# Patient Record
Sex: MALE | Race: OTHER | HISPANIC OR LATINO | URBAN - METROPOLITAN AREA
[De-identification: names, ages, dates, MRNs, and addresses within clinical notes are randomized per-mention and may not be internally consistent; named-entity substitution may affect disease eponyms.]

---
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
## 2023-03-24 ENCOUNTER — EMERGENCY (EMERGENCY)
Facility: HOSPITAL | Age: 23
LOS: 1 days | Discharge: HOME | End: 2023-03-26
Payer: MEDICAID

## 2023-03-24 DIAGNOSIS — R07.9 CHEST PAIN, UNSPECIFIED: ICD-10-CM

## 2023-03-24 DIAGNOSIS — R06.02 SHORTNESS OF BREATH: ICD-10-CM

## 2023-03-24 DIAGNOSIS — R07.89 OTHER CHEST PAIN: ICD-10-CM

## 2023-03-24 DIAGNOSIS — R07.81 PLEURODYNIA: ICD-10-CM

## 2023-03-24 DIAGNOSIS — M54.9 DORSALGIA, UNSPECIFIED: ICD-10-CM

## 2023-03-24 PROCEDURE — 80048 BASIC METABOLIC PNL TOTAL CA: CPT

## 2023-03-24 PROCEDURE — 99053 MED SERV 10PM-8AM 24 HR FAC: CPT

## 2023-03-24 PROCEDURE — 85379 FIBRIN DEGRADATION QUANT: CPT

## 2023-03-24 PROCEDURE — 84484 ASSAY OF TROPONIN QUANT: CPT

## 2023-03-24 PROCEDURE — 96374 THER/PROPH/DIAG INJ IV PUSH: CPT

## 2023-03-24 PROCEDURE — 93005 ELECTROCARDIOGRAM TRACING: CPT

## 2023-03-24 PROCEDURE — 71045 X-RAY EXAM CHEST 1 VIEW: CPT

## 2023-03-24 PROCEDURE — 99285 EMERGENCY DEPT VISIT HI MDM: CPT

## 2023-03-24 PROCEDURE — 36415 COLL VENOUS BLD VENIPUNCTURE: CPT

## 2023-03-24 PROCEDURE — 71045 X-RAY EXAM CHEST 1 VIEW: CPT | Mod: 26

## 2023-03-24 PROCEDURE — 93010 ELECTROCARDIOGRAM REPORT: CPT

## 2023-03-24 PROCEDURE — 99285 EMERGENCY DEPT VISIT HI MDM: CPT | Mod: 25

## 2023-03-24 PROCEDURE — 85025 COMPLETE CBC W/AUTO DIFF WBC: CPT

## 2023-03-25 LAB
ANION GAP SERPL CALC-SCNC: 5 MMOL/L — SIGNIFICANT CHANGE UP (ref 5–17)
BASOPHILS # BLD AUTO: 0.06 K/UL — SIGNIFICANT CHANGE UP (ref 0–0.2)
BASOPHILS NFR BLD AUTO: 0.4 % — SIGNIFICANT CHANGE UP (ref 0–2)
BUN SERPL-MCNC: 15 MG/DL — SIGNIFICANT CHANGE UP (ref 7–23)
CALCIUM SERPL-MCNC: 9.7 MG/DL — SIGNIFICANT CHANGE UP (ref 8.5–10.1)
CHLORIDE SERPL-SCNC: 105 MMOL/L — SIGNIFICANT CHANGE UP (ref 96–108)
CO2 SERPL-SCNC: 28 MMOL/L — SIGNIFICANT CHANGE UP (ref 22–31)
CREAT SERPL-MCNC: 1.22 MG/DL — SIGNIFICANT CHANGE UP (ref 0.5–1.3)
D DIMER BLD IA.RAPID-MCNC: <150 NG/ML DDU — SIGNIFICANT CHANGE UP
EGFR: 87 ML/MIN/1.73M2 — SIGNIFICANT CHANGE UP
EOSINOPHIL # BLD AUTO: 0.21 K/UL — SIGNIFICANT CHANGE UP (ref 0–0.5)
EOSINOPHIL NFR BLD AUTO: 1.3 % — SIGNIFICANT CHANGE UP (ref 0–6)
GLUCOSE SERPL-MCNC: 107 MG/DL — HIGH (ref 70–99)
HCT VFR BLD CALC: 46.4 % — SIGNIFICANT CHANGE UP (ref 39–50)
HGB BLD-MCNC: 15.5 G/DL — SIGNIFICANT CHANGE UP (ref 13–17)
IMM GRANULOCYTES NFR BLD AUTO: 0.4 % — SIGNIFICANT CHANGE UP (ref 0–0.9)
LYMPHOCYTES # BLD AUTO: 23.1 % — SIGNIFICANT CHANGE UP (ref 13–44)
LYMPHOCYTES # BLD AUTO: 3.61 K/UL — HIGH (ref 1–3.3)
MCHC RBC-ENTMCNC: 28.5 PG — SIGNIFICANT CHANGE UP (ref 27–34)
MCHC RBC-ENTMCNC: 33.4 GM/DL — SIGNIFICANT CHANGE UP (ref 32–36)
MCV RBC AUTO: 85.5 FL — SIGNIFICANT CHANGE UP (ref 80–100)
MONOCYTES # BLD AUTO: 0.98 K/UL — HIGH (ref 0–0.9)
MONOCYTES NFR BLD AUTO: 6.3 % — SIGNIFICANT CHANGE UP (ref 2–14)
NEUTROPHILS # BLD AUTO: 10.68 K/UL — HIGH (ref 1.8–7.4)
NEUTROPHILS NFR BLD AUTO: 68.5 % — SIGNIFICANT CHANGE UP (ref 43–77)
PLATELET # BLD AUTO: 332 K/UL — SIGNIFICANT CHANGE UP (ref 150–400)
POTASSIUM SERPL-MCNC: 4.1 MMOL/L — SIGNIFICANT CHANGE UP (ref 3.5–5.3)
POTASSIUM SERPL-SCNC: 4.1 MMOL/L — SIGNIFICANT CHANGE UP (ref 3.5–5.3)
RBC # BLD: 5.43 M/UL — SIGNIFICANT CHANGE UP (ref 4.2–5.8)
RBC # FLD: 13.5 % — SIGNIFICANT CHANGE UP (ref 10.3–14.5)
SODIUM SERPL-SCNC: 138 MMOL/L — SIGNIFICANT CHANGE UP (ref 135–145)
TROPONIN I, HIGH SENSITIVITY RESULT: 41.06 NG/L — SIGNIFICANT CHANGE UP
WBC # BLD: 15.61 K/UL — HIGH (ref 3.8–10.5)
WBC # FLD AUTO: 15.61 K/UL — HIGH (ref 3.8–10.5)

## 2023-03-26 PROBLEM — Z78.9 OTHER SPECIFIED HEALTH STATUS: Chronic | Status: ACTIVE | Noted: 2021-09-20

## 2024-01-15 ENCOUNTER — EMERGENCY (EMERGENCY)
Facility: HOSPITAL | Age: 24
LOS: 0 days | Discharge: HOME | End: 2024-01-15
Attending: STUDENT IN AN ORGANIZED HEALTH CARE EDUCATION/TRAINING PROGRAM
Payer: MEDICAID

## 2024-01-15 VITALS — HEIGHT: 69 IN | WEIGHT: 205.03 LBS

## 2024-01-15 VITALS
TEMPERATURE: 100 F | OXYGEN SATURATION: 98 % | SYSTOLIC BLOOD PRESSURE: 118 MMHG | DIASTOLIC BLOOD PRESSURE: 59 MMHG | RESPIRATION RATE: 18 BRPM | HEART RATE: 91 BPM

## 2024-01-15 DIAGNOSIS — Z20.822 CONTACT WITH AND (SUSPECTED) EXPOSURE TO COVID-19: ICD-10-CM

## 2024-01-15 DIAGNOSIS — B34.9 VIRAL INFECTION, UNSPECIFIED: ICD-10-CM

## 2024-01-15 DIAGNOSIS — R50.9 FEVER, UNSPECIFIED: ICD-10-CM

## 2024-01-15 DIAGNOSIS — R11.10 VOMITING, UNSPECIFIED: ICD-10-CM

## 2024-01-15 DIAGNOSIS — R05.9 COUGH, UNSPECIFIED: ICD-10-CM

## 2024-01-15 LAB
FLUAV AG NPH QL: SIGNIFICANT CHANGE UP
FLUAV AG NPH QL: SIGNIFICANT CHANGE UP
FLUBV AG NPH QL: DETECTED
FLUBV AG NPH QL: DETECTED
RSV RNA NPH QL NAA+NON-PROBE: SIGNIFICANT CHANGE UP
RSV RNA NPH QL NAA+NON-PROBE: SIGNIFICANT CHANGE UP
SARS-COV-2 RNA SPEC QL NAA+PROBE: SIGNIFICANT CHANGE UP
SARS-COV-2 RNA SPEC QL NAA+PROBE: SIGNIFICANT CHANGE UP

## 2024-01-15 PROCEDURE — 0241U: CPT

## 2024-01-15 PROCEDURE — 99284 EMERGENCY DEPT VISIT MOD MDM: CPT

## 2024-01-15 PROCEDURE — 99283 EMERGENCY DEPT VISIT LOW MDM: CPT

## 2024-01-15 RX ORDER — ONDANSETRON 8 MG/1
4 TABLET, FILM COATED ORAL ONCE
Refills: 0 | Status: COMPLETED | OUTPATIENT
Start: 2024-01-15 | End: 2024-01-15

## 2024-01-15 RX ORDER — IBUPROFEN 200 MG
600 TABLET ORAL ONCE
Refills: 0 | Status: COMPLETED | OUTPATIENT
Start: 2024-01-15 | End: 2024-01-15

## 2024-01-15 RX ORDER — ACETAMINOPHEN 500 MG
650 TABLET ORAL ONCE
Refills: 0 | Status: COMPLETED | OUTPATIENT
Start: 2024-01-15 | End: 2024-01-15

## 2024-01-15 RX ORDER — ONDANSETRON 8 MG/1
1 TABLET, FILM COATED ORAL
Qty: 2 | Refills: 0
Start: 2024-01-15 | End: 2024-01-19

## 2024-01-15 RX ADMIN — ONDANSETRON 4 MILLIGRAM(S): 8 TABLET, FILM COATED ORAL at 14:16

## 2024-01-15 RX ADMIN — Medication 600 MILLIGRAM(S): at 14:15

## 2024-01-15 RX ADMIN — Medication 650 MILLIGRAM(S): at 14:16

## 2024-01-15 NOTE — ED STATDOCS - PATIENT PORTAL LINK FT
You can access the FollowMyHealth Patient Portal offered by [facility] by registering at the following website: [email or web address]. By joining [employer]’s FollowMyHealth portal, you will also be able to view your health information using other applications (apps) compatible with our system. You can access the FollowMyHealth Patient Portal offered by [facility] by registering at the following website: [email or web address]. By joining [employer]’s FollowMyHealth portal, you will also be able to view your health information using other applications (apps) compatible with our system. You can access the FollowMyHealth Patient Portal offered by [facility] by registering at the following website: [email or web address]. By joining [employer]’s FollowMyHealth portal, you will also be able to view your health information using other applications (apps) compatible with our system.

## 2024-01-15 NOTE — ED ADULT TRIAGE NOTE - CHIEF COMPLAINT QUOTE
PT presents to er with complaints of cough/fever and emesis since Saturday, PT denies medical problems.

## 2024-01-15 NOTE — ED STATDOCS - PHYSICAL EXAMINATION
GENERAL: A&Ox4, non-toxic appearing, no acute distress  HEENT: NCAT, EOMI, oral mucosa moist, normal conjunctiva  RESP: CTAB, no respiratory distress, no wheezes/rhonchi/rales, speaking in full sentences  CV: RRR, no murmurs/rubs/gallops  ABDOMEN: soft, non-tender, non-distended, no guarding, no rebound tenderness  MSK: no visible deformities  NEURO: no focal sensory or motor deficits, CN 2-12 grossly intact  SKIN: warm, normal color, well perfused, no rash  PSYCH: normal affect GENERAL: A&Ox4, non-toxic appearing, no acute distress  HEENT: NCAT, EOMI, oral mucosa moist, normal conjunctiva, nasal congestion  RESP: CTAB, no respiratory distress, no wheezes/rhonchi/rales, speaking in full sentences  CV: RRR, no murmurs/rubs/gallops  ABDOMEN: soft, non-tender, non-distended, no guarding, no rebound tenderness  MSK: no visible deformities  NEURO: no focal sensory or motor deficits, CN 2-12 grossly intact  SKIN: warm, normal color, well perfused, no rash  PSYCH: normal affect

## 2024-01-15 NOTE — ED STATDOCS - NSFOLLOWUPINSTRUCTIONS_ED_ALL_ED_FT
Please use 650mg tylenol (or acetaminophen) every 6 hours and 600mg motrin (or advil or ibuprofen) every 6 hours as needed for pain/discomfort/swelling.  Make sure not to use more than 3500mg in any 24 hour period.   Take zofran as needed for nausea  Any worsening of symptoms or new concerning symptoms return to the ED       Enfermedades virales en los adultos  (Viral Illness, Adult)  Los virus son microbios diminutos que entran en el organismo de danette persona y causan enfermedades. Hay muchos tipos de virus diferentes y causan muchas clases de enfermedades. Las enfermedades virales pueden ser leves o graves. Pueden afectar diferentes partes del cuerpo.  Las enfermedades frecuentes causadas por virus incluyen los resfríos y la gripe. Además, las enfermedades virales abarcan andrei clínicos graves, reynaldo el VIH/sida (virus de inmunodeficiencia humana/síndrome de inmunodeficiencia adquirida). Se marvin identificado unos pocos virus asociados con determinados tipos de cáncer.  ¿CUÁLES SON LAS CAUSAS?  Muchos tipos de virus pueden causar enfermedades. Los virus invaden las células del organismo, se multiplican y provocan la disfunción o la muerte de las células infectadas. Cuando la célula muere, libera más virus. Cuando esto ocurre, aparecen síntomas de la enfermedad, y el virus sigue diseminándose a otras células. Si el virus asume la función de la célula, puede hacer que esta se divida y crezca fuera de control, y paco es el sam en el que un virus causa cáncer.  Los diferentes virus ingresan al organismo de distintas formas. Puede contraer un virus de la siguiente manera:  Al ingerir alimentos o beber agua contaminados con el virus.Al inhalar gotitas que danette persona infectada liberó en el aire al toser o estornudar.Al tocar danette superficie contaminada con el virus y luego llevarse la mano a la boca, la nariz o los ojos.Al ser anna por un insecto o mordido por un animal que son portadores del virus.Al tener contacto sexual con danette persona infectada por el virus.Al tener contacto con jessica o líquidos que contienen el virus, ya sea a través de un raudel abierto o bernardino danette transfusión.Si el virus ingresa al organismo, el sistema de defensa del cuerpo (sistema inmunitario) intentará combatirlo. Puede correr un riesgo más alto de tener danette enfermedad viral si tiene el sistema inmunitario debilitado.  ¿CUÁLES SON LOS SIGNOS O LOS SÍNTOMAS?  Los síntomas varían en función del tipo de virus y de la ubicación de las células que paco invade. Los síntomas frecuentes de los principales tipos de enfermedades virales incluyen los siguientes:  Virus del resfrío y de la gripe   Fiebre.Dolor de mohamud.Dolor de garganta.Eliz musculares.Congestión nasal.Tos.Virus del aparato digestivo (gastrointestinales)  Fiebre.Dolor abdominal.Náuseas.Diarrea.Virus hepáticos (hepatitis)   Pérdida del apetito.Cansancio.Keith amarillento de la piel (ictericia).Virus del cerebro y la médula sen   Fiebre.Dolor de mohamud.Rigidez en el manoj.Náuseas y vómitos.Confusión o somnolencia.Virus de la piel   Verrugas.Picazón.Erupción cutánea.Virus de transmisión sexual   Secreción.Hinchazón.Enrojecimiento.Erupción cutánea.¿CÓMO SE TRATA ESTA AFECCIÓN?  Los virus pueden ser difíciles de tratar porque se hospedan en las células. Los antibióticos no tratan los virus porque no llegan al interior de las células. El tratamiento de danette enfermedad viral puede incluir lo siguiente:  Descansar y beber abundantes líquidos.Medicamentos para aliviar los síntomas. Estos pueden incluir medicamentos de venta syed para el dolor y la fiebre, medicamentos para la tos o la congestión y medicamentos para aliviar la diarrea.Medicamentos antivirales. Estos fármacos están disponibles únicamente para determinados tipos de virus. Pueden ayudar a aliviar los síntomas de la gripe, si se patti apenas comienza el cuadro. También hay antivirales para la hepatitis y el VIH/sida.Algunas enfermedades virales pueden evitarse con vacunas. Un ejemplo frecuente es la vacuna antigripal.  SIGA ESTAS INDICACIONES EN DAVE CASA:  Medicamentos   [place] los medicamentos de venta syed y los recetados solamente reynaldo se lo haya indicado el médico.Si le recetaron un antiviral, tómelo reynaldo se lo haya indicado el médico. No deje de calixto los medicamentos aunque comience a sentirse mejor.Infórmese sobre cuándo los antibióticos son necesarios y cuándo no. Los antibióticos no combaten a los virus. Si el médico quentin que usted puede tener danette infección bacteriana así reynaldo danette viral, arpit vez le receten un antibiótico.  No solicite danette receta de antibióticos si le marvin diagnosticado danette enfermedad viral. Eso no hará que la enfermedad pase más rápidamente.Calixto antibióticos con frecuencia cuando no son necesarios puede derivar en resistencia a los antibióticos. Cuando esto ocurre, el medicamento pierde dave eficacia contra las bacterias que normalmente combate.Instrucciones generales   Elda suficiente líquido para mantener la orina augusta o de color amarillo pálido.Descanse todo lo que pueda.Retome scar actividades normales reynaldo se lo haya indicado el médico. Pregúntele al médico qué actividades son seguras para usted.Concurra a todas las visitas de control reynaldo se lo haya indicado el médico. [place] es importante.¿CÓMO SE SANTIAGO ESTO?  [place] estas medidas para reducir el riesgo de tener danette infección viral:  Consuma danette dieta ricci y descanse mucho.Lávese las elissa frecuentemente con agua y jabón. [place] es especialmente importante cuando está en lugares públicos. Use desinfectante para elissa si no dispone de agua y jabón.Evite el contacto cercano con amigos y familiares que tengan danette infección viral.Si viaja a las regiones donde las infecciones virales gastrointestinales son frecuentes, no tome agua ni coma alimentos crudos.Manténgase al día con las vacunas. Vacúnese contra la gripe todos los años reynaldo se lo haya indicado el médico.No comparta cepillos de dientes, cortaúñas, rasuradoras o agujas con otras personas.Siempre tenga sexo con protección.COMUNÍQUESE CON UN MÉDICO SI:  Tiene síntomas de danette enfermedad viral que no desaparecen.Los síntomas regresan después de ramo desaparecido.Los síntomas empeoran.SOLICITE AYUDA DE INMEDIATO SI:  Tiene dificultad para respirar.Siente un dolor de mohamud intenso o rigidez en el manoj.Tiene vómitos david o dolor abdominal.Esta información no tiene reynaldo fin reemplazar el consejo del médico. Asegúrese de hacerle al médico cualquier pregunta que tenga. Please use 650mg tylenol (or acetaminophen) every 6 hours and 600mg motrin (or advil or ibuprofen) every 6 hours as needed for pain/discomfort/swelling.  Make sure not to use more than 3500mg in any 24 hour period.   Take zofran as needed for nausea  Any worsening of symptoms or new concerning symptoms return to the ED       Enfermedades virales en los adultos  (Viral Illness, Adult)  Los virus son microbios diminutos que entran en el organismo de danette persona y causan enfermedades. Hay muchos tipos de virus diferentes y causan muchas clases de enfermedades. Las enfermedades virales pueden ser leves o graves. Pueden afectar diferentes partes del cuerpo.  Las enfermedades frecuentes causadas por virus incluyen los resfríos y la gripe. Además, las enfermedades virales abarcan andrei clínicos graves, reynaldo el VIH/sida (virus de inmunodeficiencia humana/síndrome de inmunodeficiencia adquirida). Se marvin identificado unos pocos virus asociados con determinados tipos de cáncer.  ¿CUÁLES SON LAS CAUSAS?  Muchos tipos de virus pueden causar enfermedades. Los virus invaden las células del organismo, se multiplican y provocan la disfunción o la muerte de las células infectadas. Cuando la célula muere, libera más virus. Cuando esto ocurre, aparecen síntomas de la enfermedad, y el virus sigue diseminándose a otras células. Si el virus asume la función de la célula, puede hacer que esta se divida y crezca fuera de control, y paco es el sam en el que un virus causa cáncer.  Los diferentes virus ingresan al organismo de distintas formas. Puede contraer un virus de la siguiente manera:  Al ingerir alimentos o beber agua contaminados con el virus.Al inhalar gotitas que danette persona infectada liberó en el aire al toser o estornudar.Al tocar danette superficie contaminada con el virus y luego llevarse la mano a la boca, la nariz o los ojos.Al ser anna por un insecto o mordido por un animal que son portadores del virus.Al tener contacto sexual con danette persona infectada por el virus.Al tener contacto con jessica o líquidos que contienen el virus, ya sea a través de un raudel abierto o bernardino danette transfusión.Si el virus ingresa al organismo, el sistema de defensa del cuerpo (sistema inmunitario) intentará combatirlo. Puede correr un riesgo más alto de tener danette enfermedad viral si tiene el sistema inmunitario debilitado.  ¿CUÁLES SON LOS SIGNOS O LOS SÍNTOMAS?  Los síntomas varían en función del tipo de virus y de la ubicación de las células que paco invade. Los síntomas frecuentes de los principales tipos de enfermedades virales incluyen los siguientes:  Virus del resfrío y de la gripe   Fiebre.Dolor de mohamud.Dolor de garganta.Eliz musculares.Congestión nasal.Tos.Virus del aparato digestivo (gastrointestinales)  Fiebre.Dolor abdominal.Náuseas.Diarrea.Virus hepáticos (hepatitis)   Pérdida del apetito.Cansancio.Keith amarillento de la piel (ictericia).Virus del cerebro y la médula sen   Fiebre.Dolor de mohamud.Rigidez en el manoj.Náuseas y vómitos.Confusión o somnolencia.Virus de la piel   Verrugas.Picazón.Erupción cutánea.Virus de transmisión sexual   Secreción.Hinchazón.Enrojecimiento.Erupción cutánea.¿CÓMO SE TRATA ESTA AFECCIÓN?  Los virus pueden ser difíciles de tratar porque se hospedan en las células. Los antibióticos no tratan los virus porque no llegan al interior de las células. El tratamiento de danette enfermedad viral puede incluir lo siguiente:  Descansar y beber abundantes líquidos.Medicamentos para aliviar los síntomas. Estos pueden incluir medicamentos de venta ysed para el dolor y la fiebre, medicamentos para la tos o la congestión y medicamentos para aliviar la diarrea.Medicamentos antivirales. Estos fármacos están disponibles únicamente para determinados tipos de virus. Pueden ayudar a aliviar los síntomas de la gripe, si se patti apenas comienza el cuadro. También hay antivirales para la hepatitis y el VIH/sida.Algunas enfermedades virales pueden evitarse con vacunas. Un ejemplo frecuente es la vacuna antigripal.  SIGA ESTAS INDICACIONES EN DAVE CASA:  Medicamentos   [place] los medicamentos de venta syed y los recetados solamente reynaldo se lo haya indicado el médico.Si le recetaron un antiviral, tómelo reynaldo se lo haya indicado el médico. No deje de calixto los medicamentos aunque comience a sentirse mejor.Infórmese sobre cuándo los antibióticos son necesarios y cuándo no. Los antibióticos no combaten a los virus. Si el médico quentin que usted puede tener danette infección bacteriana así reynaldo danette viral, arpit vez le receten un antibiótico.  No solicite danette receta de antibióticos si le marvin diagnosticado danette enfermedad viral. Eso no hará que la enfermedad pase más rápidamente.Calixto antibióticos con frecuencia cuando no son necesarios puede derivar en resistencia a los antibióticos. Cuando esto ocurre, el medicamento pierde dave eficacia contra las bacterias que normalmente combate.Instrucciones generales   Elda suficiente líquido para mantener la orina augusta o de color amarillo pálido.Descanse todo lo que pueda.Retome scar actividades normales reynaldo se lo haya indicado el médico. Pregúntele al médico qué actividades son seguras para usted.Concurra a todas las visitas de control reynaldo se lo haya indicado el médico. [place] es importante.¿CÓMO SE SANTIAGO ESTO?  [place] estas medidas para reducir el riesgo de tener danette infección viral:  Consuma danette dieta ricci y descanse mucho.Lávese las elissa frecuentemente con agua y jabón. [place] es especialmente importante cuando está en lugares públicos. Use desinfectante para elissa si no dispone de agua y jabón.Evite el contacto cercano con amigos y familiares que tengan danette infección viral.Si viaja a las regiones donde las infecciones virales gastrointestinales son frecuentes, no tome agua ni coma alimentos crudos.Manténgase al día con las vacunas. Vacúnese contra la gripe todos los años reynaldo se lo haya indicado el médico.No comparta cepillos de dientes, cortaúñas, rasuradoras o agujas con otras personas.Siempre tenga sexo con protección.COMUNÍQUESE CON UN MÉDICO SI:  Tiene síntomas de danette enfermedad viral que no desaparecen.Los síntomas regresan después de ramo desaparecido.Los síntomas empeoran.SOLICITE AYUDA DE INMEDIATO SI:  Tiene dificultad para respirar.Siente un dolor de mohamud intenso o rigidez en el manoj.Tiene vómitos david o dolor abdominal.Esta información no tiene reynaldo fin reemplazar el consejo del médico. Asegúrese de hacerle al médico cualquier pregunta que tenga. Please use 650mg tylenol (or acetaminophen) every 6 hours and 600mg motrin (or advil or ibuprofen) every 6 hours as needed for pain/discomfort/swelling.  Make sure not to use more than 3500mg in any 24 hour period.   Take zofran as needed for nausea  Any worsening of symptoms or new concerning symptoms return to the ED       Enfermedades virales en los adultos  (Viral Illness, Adult)  Los virus son microbios diminutos que entran en el organismo de danette persona y causan enfermedades. Hay muchos tipos de virus diferentes y causan muchas clases de enfermedades. Las enfermedades virales pueden ser leves o graves. Pueden afectar diferentes partes del cuerpo.  Las enfermedades frecuentes causadas por virus incluyen los resfríos y la gripe. Además, las enfermedades virales abarcan andrei clínicos graves, reynaldo el VIH/sida (virus de inmunodeficiencia humana/síndrome de inmunodeficiencia adquirida). Se marvin identificado unos pocos virus asociados con determinados tipos de cáncer.  ¿CUÁLES SON LAS CAUSAS?  Muchos tipos de virus pueden causar enfermedades. Los virus invaden las células del organismo, se multiplican y provocan la disfunción o la muerte de las células infectadas. Cuando la célula muere, libera más virus. Cuando esto ocurre, aparecen síntomas de la enfermedad, y el virus sigue diseminándose a otras células. Si el virus asume la función de la célula, puede hacer que esta se divida y crezca fuera de control, y paco es el sam en el que un virus causa cáncer.  Los diferentes virus ingresan al organismo de distintas formas. Puede contraer un virus de la siguiente manera:  Al ingerir alimentos o beber agua contaminados con el virus.Al inhalar gotitas que danette persona infectada liberó en el aire al toser o estornudar.Al tocar danette superficie contaminada con el virus y luego llevarse la mano a la boca, la nariz o los ojos.Al ser anna por un insecto o mordido por un animal que son portadores del virus.Al tener contacto sexual con danette persona infectada por el virus.Al tener contacto con jessica o líquidos que contienen el virus, ya sea a través de un raudel abierto o bernardino danette transfusión.Si el virus ingresa al organismo, el sistema de defensa del cuerpo (sistema inmunitario) intentará combatirlo. Puede correr un riesgo más alto de tener danette enfermedad viral si tiene el sistema inmunitario debilitado.  ¿CUÁLES SON LOS SIGNOS O LOS SÍNTOMAS?  Los síntomas varían en función del tipo de virus y de la ubicación de las células que paco invade. Los síntomas frecuentes de los principales tipos de enfermedades virales incluyen los siguientes:  Virus del resfrío y de la gripe   Fiebre.Dolor de mohamud.Dolor de garganta.Eliz musculares.Congestión nasal.Tos.Virus del aparato digestivo (gastrointestinales)  Fiebre.Dolor abdominal.Náuseas.Diarrea.Virus hepáticos (hepatitis)   Pérdida del apetito.Cansancio.Keith amarillento de la piel (ictericia).Virus del cerebro y la médula sen   Fiebre.Dolor de mohamud.Rigidez en el manoj.Náuseas y vómitos.Confusión o somnolencia.Virus de la piel   Verrugas.Picazón.Erupción cutánea.Virus de transmisión sexual   Secreción.Hinchazón.Enrojecimiento.Erupción cutánea.¿CÓMO SE TRATA ESTA AFECCIÓN?  Los virus pueden ser difíciles de tratar porque se hospedan en las células. Los antibióticos no tratan los virus porque no llegan al interior de las células. El tratamiento de danette enfermedad viral puede incluir lo siguiente:  Descansar y beber abundantes líquidos.Medicamentos para aliviar los síntomas. Estos pueden incluir medicamentos de venta syed para el dolor y la fiebre, medicamentos para la tos o la congestión y medicamentos para aliviar la diarrea.Medicamentos antivirales. Estos fármacos están disponibles únicamente para determinados tipos de virus. Pueden ayudar a aliviar los síntomas de la gripe, si se patti apenas comienza el cuadro. También hay antivirales para la hepatitis y el VIH/sida.Algunas enfermedades virales pueden evitarse con vacunas. Un ejemplo frecuente es la vacuna antigripal.  SIGA ESTAS INDICACIONES EN DAVE CASA:  Medicamentos   [place] los medicamentos de venta syed y los recetados solamente reynaldo se lo haya indicado el médico.Si le recetaron un antiviral, tómelo reynaldo se lo haya indicado el médico. No deje de calixto los medicamentos aunque comience a sentirse mejor.Infórmese sobre cuándo los antibióticos son necesarios y cuándo no. Los antibióticos no combaten a los virus. Si el médico quentin que usted puede tener danette infección bacteriana así reynaldo danette viral, arpit vez le receten un antibiótico.  No solicite danette receta de antibióticos si le marvin diagnosticado danette enfermedad viral. Eso no hará que la enfermedad pase más rápidamente.Calixto antibióticos con frecuencia cuando no son necesarios puede derivar en resistencia a los antibióticos. Cuando esto ocurre, el medicamento pierde dave eficacia contra las bacterias que normalmente combate.Instrucciones generales   Elda suficiente líquido para mantener la orina augusta o de color amarillo pálido.Descanse todo lo que pueda.Retome scar actividades normales reynaldo se lo haya indicado el médico. Pregúntele al médico qué actividades son seguras para usted.Concurra a todas las visitas de control reynaldo se lo haya indicado el médico. [place] es importante.¿CÓMO SE SANTIAGO ESTO?  [place] estas medidas para reducir el riesgo de tener danette infección viral:  Consuma danette dieta ricci y descanse mucho.Lávese las elissa frecuentemente con agua y jabón. [place] es especialmente importante cuando está en lugares públicos. Use desinfectante para elissa si no dispone de agua y jabón.Evite el contacto cercano con amigos y familiares que tengan danette infección viral.Si viaja a las regiones donde las infecciones virales gastrointestinales son frecuentes, no tome agua ni coma alimentos crudos.Manténgase al día con las vacunas. Vacúnese contra la gripe todos los años reynaldo se lo haya indicado el médico.No comparta cepillos de dientes, cortaúñas, rasuradoras o agujas con otras personas.Siempre tenga sexo con protección.COMUNÍQUESE CON UN MÉDICO SI:  Tiene síntomas de danette enfermedad viral que no desaparecen.Los síntomas regresan después de ramo desaparecido.Los síntomas empeoran.SOLICITE AYUDA DE INMEDIATO SI:  Tiene dificultad para respirar.Siente un dolor de mohamud intenso o rigidez en el manoj.Tiene vómitos david o dolor abdominal.Esta información no tiene reynaldo fin reemplazar el consejo del médico. Asegúrese de hacerle al médico cualquier pregunta que tenga.

## 2024-01-15 NOTE — ED STATDOCS - OBJECTIVE STATEMENT
22 y/o male with no pertinent PMHx presents to the ED c/o fever, chills, cough, and nausea/vomiting x2 days. Has been taking Tylenol for symptoms, last dose last night. No diarrhea. Denies recent travel or sick contacts.  Pharmacy: [facility] [address] 24 y/o male with no pertinent PMHx presents to the ED c/o fever, chills, cough, and nausea/vomiting x2 days. Has been taking Tylenol for symptoms, last dose last night. No diarrhea. Denies recent travel or sick contacts.  Pharmacy: [facility] [address] 24 y/o male with no pertinent PMHx presents to the ED c/o fever, chills, cough, and nausea/vomiting x2 days. Has been taking Tylenol for symptoms, last dose last night. No diarrhea. Denies recent travel or sick contacts.  Pharmacy: [facility] [address] 22 y/o male with no pertinent PMHx presents to the ED c/o fever, chills, cough, and nausea/vomiting x2 days. Has been taking Tylenol for symptoms, last dose last night. No diarrhea. denies cp, sob, cough, weakness, abd pain. Denies recent travel or sick contacts. 24 y/o male with no pertinent PMHx presents to the ED c/o fever, chills, cough, and nausea/vomiting x2 days. Has been taking Tylenol for symptoms, last dose last night. No diarrhea. denies cp, sob, cough, weakness, abd pain. Denies recent travel or sick contacts.

## 2024-01-15 NOTE — ED STATDOCS - CLINICAL SUMMARY MEDICAL DECISION MAKING FREE TEXT BOX
22 y/o male with no pertinent PMHx presents to the ED c/o fever, chills, cough, and nausea/vomiting x2 days. Has been taking Tylenol for symptoms, last dose last night. No diarrhea. denies cp, sob, cough, weakness, abd pain. Denies recent travel or sick contacts.  unremarkable exam well appearing and non toxic, vitals normal except temp 100.2. will give symptomatic tx and swab.   Jason [id number]  discussed with pt and his mother. Discussed with patient need to return to ED if symptoms don't continue to improve or recur or develops any new or worsening symptoms that are of concern. 24 y/o male with no pertinent PMHx presents to the ED c/o fever, chills, cough, and nausea/vomiting x2 days. Has been taking Tylenol for symptoms, last dose last night. No diarrhea. denies cp, sob, cough, weakness, abd pain. Denies recent travel or sick contacts.  unremarkable exam well appearing and non toxic, vitals normal except temp 100.2. will give symptomatic tx and swab.   Jason [id number]  discussed with pt and his mother. Discussed with patient need to return to ED if symptoms don't continue to improve or recur or develops any new or worsening symptoms that are of concern. 22 y/o male with no pertinent PMHx presents to the ED c/o fever, chills, cough, and nausea/vomiting x2 days. Has been taking Tylenol for symptoms, last dose last night. No diarrhea. denies cp, sob, cough, weakness, abd pain. Denies recent travel or sick contacts.  unremarkable exam well appearing and non toxic, vitals normal except temp 100.2. will give symptomatic tx and swab.   Jason [id number]  discussed with pt and his mother. Discussed with patient need to return to ED if symptoms don't continue to improve or recur or develops any new or worsening symptoms that are of concern.

## 2025-04-15 NOTE — ED STATDOCS - ATTENDING APP SHARED VISIT CONTRIBUTION OF CARE
BS log due. Last was 2024.    DO Vinh - Attending:  I personally made/approved the management plan and take responsibility for the patient management. I performed a face to face bedside interview with patient regarding history of present illness, review of symptoms and past medical history. I completed an independent physical exam. I have discussed patient's plan of care with PA. Documentation as above in the note.     23-year-old male here with fever, chills, body aches, nausea, vomiting.  Patient overall well-appearing, borderline febrile.  Likely viral illness, no concern for serious bacterial illness.  Will treat symptomatically, flu/COVID swab, discharged with PMD follow-up.